# Patient Record
Sex: FEMALE | Employment: FULL TIME | URBAN - METROPOLITAN AREA
[De-identification: names, ages, dates, MRNs, and addresses within clinical notes are randomized per-mention and may not be internally consistent; named-entity substitution may affect disease eponyms.]

---

## 2019-02-13 ENCOUNTER — HOSPITAL ENCOUNTER (OUTPATIENT)
Dept: PHYSICAL THERAPY | Age: 49
Discharge: HOME | End: 2019-02-13
Payer: COMMERCIAL

## 2019-02-13 PROCEDURE — 97140 MANUAL THERAPY 1/> REGIONS: CPT

## 2019-02-13 PROCEDURE — 97110 THERAPEUTIC EXERCISES: CPT

## 2019-02-13 PROCEDURE — 97162 PT EVAL MOD COMPLEX 30 MIN: CPT

## 2019-02-13 NOTE — PROGRESS NOTES
In [address] [facility] [address] [place], [address] 
[phone] fax Plan of Care/ Statement of Necessity for Physical Therapy Services Patient name: Jose Alberto Johnson Start of Care: 2019 Referral source: Madi Helm MD : 1970 Medical Diagnosis: Cervicalgia [M54.2] Payor: [employer] / Plan: [place] [employer] / Product Type: PPO /  Onset Date:1 month ago Treatment Diagnosis: neck pain Prior Hospitalization: see medical history Provider#: [id number] Medications: Verified on Patient summary List  
 Comorbidities: allergies, OA, back pain, DM Prior Level of Function: Employed as a  and works out recreationally The Plan of Care and following information is based on the information from the initial evaluation. Assessment/ key information: Pt presents with complaints of acute left sided neck pain with no known CHRISTINA. Pain is mostly present in the left UT and occasionally causes HA pain. Continue PT to address posture, cervical pain, core strength, and pain. Evaluation Complexity History LOW Complexity : Zero comorbidities / personal factors that will impact the outcome / POC; Examination MEDIUM Complexity : 3 Standardized tests and measures addressing body structure, function, activity limitation and / or participation in recreation  ;Presentation MEDIUM Complexity : Evolving with changing characteristics  ; Clinical Decision Making MEDIUM Complexity : FOTO score of 26-74 Overall Complexity Rating: MEDIUM Problem List: pain affecting function, decrease ROM, decrease strength, impaired gait/ balance, decrease ADL/ functional abilitiies, decrease activity tolerance and decrease flexibility/ joint mobility Treatment Plan may include any combination of the following: Therapeutic exercise, Therapeutic activities, Neuromuscular re-education, Physical agent/modality, Gait/balance training, Manual therapy and Patient education Patient / Family readiness to learn indicated by: asking questions, trying to perform skills and interest 
Persons(s) to be included in education: patient (P) Barriers to Learning/Limitations: None Patient Goal (s): To eliminate the pain Patient Self Reported Health Status: good Rehabilitation Potential: good Short Term Goals: To be accomplished in 2 weeks: 1. Pt will be compliant with HEP 2x/day to increase ease of ADLs 2. Pt will improve left cervical rotation to 60 degrees without pain to increase ease of ADLs Long Term Goals: To be accomplished in 4 weeks: 1. Pt will improve FOTO to 63 to increase ease of ADLs 2. 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 Pt will be able to look up to see a bird without difficulty to increase ease of ADLs Frequency / Duration: Patient to be seen 2 times per week for 4 weeks. Patient/ Caregiver education and instruction: Diagnosis, prognosis, self care, activity modification and exercises 
 [x]  Plan of care has been reviewed with Tasha Isidro 2/13/2019 12:15 PM 
 
________________________________________________________________________ I certify that the above Therapy Services are being furnished while the patient is under my care. I agree with the treatment plan and certify that this therapy is necessary. [de-identified] Signature:____________Date:_________TIME:________ 
 
[employer], Date and Time must be completed for valid certification ** Please sign and return to In [address] [place], [address] 
[phone] fax

## 2019-02-13 NOTE — PROGRESS NOTES
PT DAILY TREATMENT NOTE 10-18 Patient Name: Nestor Mariscal Date:2019 : 1970 [x]  Patient  Verified Payor: BLUE CROSS / Plan: [place] [employer] / Product Type: PPO / In time:1105  Out time:1149 Total Treatment Time (min): 44 Visit #: 1 of 8 Medicare/BCBS Only Total Timed Codes (min):  23 1:1 Treatment Time:  44 Treatment Area: Cervicalgia [M54.2] SUBJECTIVE Pain Level (0-10 scale): 10 Any medication changes, allergies to medications, adverse drug reactions, diagnosis change, or new procedure performed?: [x] No    [] Yes (see summary sheet for update) Subjective functional status/changes:   [] No changes reported Severe left sided neck pain without CHRISTINA OBJECTIVE 21 min [x]Eval                  []Re-Eval  
 
8 min Manual Therapy:  SOR, MET for C4 right rotation Rationale: decrease pain, increase ROM, increase tissue extensibility, decrease trigger points and increase postural awareness to improve cervical mobility and pain 15 min Therapeutic Exercise:  [x] See flow sheet :  
Rationale: increase ROM and increase strength to improve the patients ability to increase ease of ADLs With 
 [] TE 
 [] TA 
 [] neuro 
 [] other: Patient Education: [x] Review HEP [] Progressed/Changed HEP based on:  
[] positioning   [] body mechanics   [] transfers   [] heat/ice application   
[] other:   
 
Other Objective/Functional Measures: refer to eval  
 
Pain Level (0-10 scale) post treatment: 5 
 
ASSESSMENT/Changes in Function: 50% less pain after HEP instruction and light manual. Note that pt does not tolerate manual therapy very well but therapist is hopeful that HEP with desensitize her for better tolerance.  
 
Patient will continue to benefit from skilled PT services to modify and progress therapeutic interventions, address functional mobility deficits, address ROM deficits, address strength deficits, analyze and address soft tissue restrictions, analyze and cue movement patterns, analyze and modify body mechanics/ergonomics and assess and modify postural abnormalities to attain remaining goals. []  See Plan of Care 
[]  See progress note/recertification 
[]  See Discharge Summary Progress towards goals Short Term Goals: To be accomplished in 2 weeks: 1. Pt will be compliant with HEP 2x/day to increase ease of ADLs 2. Pt will improve left cervical rotation to 60 degrees without pain to increase ease of ADLs 
  
Long Term Goals: To be accomplished in 4 weeks: 1. Pt will improve FOTO to 63 to increase ease of ADLs 2. 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 Pt will be able to look up to see a bird without difficulty to increase ease of ADLs 
  
 
 
PLAN 
[]  Upgrade activities as tolerated     [x]  Continue plan of care 
[]  Update interventions per flow sheet      
[]  Discharge due to:_ 
[]  Other:_ Lurline Homans, [place] 2/13/2019  12:31 PM 
 
Future Appointments Date Time Provider Mike Montenegro 2/20/2019  6:00 PM Tico Fletcher, PT [facility]  
2/22/2019  4:30 PM Roslyn Herrera, PT [facility]  
2/26/2019  5:30 PM Sary Phlegm, PTA [facility]  
2/27/2019 12:00 PM Yuriy Payan, PT [facility]  
3/5/2019  5:30 PM Sary Phlegm, PTA [facility]  
3/8/2019  6:00 PM Roslyn Herrera, PT [facility]  
3/18/2019  6:00 PM Yuriy Payan, PT [facility]

## 2019-02-20 ENCOUNTER — HOSPITAL ENCOUNTER (OUTPATIENT)
Dept: PHYSICAL THERAPY | Age: 49
Discharge: HOME | End: 2019-02-20
Payer: COMMERCIAL

## 2019-02-20 PROCEDURE — 97110 THERAPEUTIC EXERCISES: CPT

## 2019-02-20 PROCEDURE — 97012 MECHANICAL TRACTION THERAPY: CPT

## 2019-02-20 PROCEDURE — 97140 MANUAL THERAPY 1/> REGIONS: CPT

## 2019-02-20 NOTE — PROGRESS NOTES
PT DAILY TREATMENT NOTE 10-18 Patient Name: Arian Almaguer Date:2019 : 1970 [x]  Patient  Verified Payor: BLUE CROSS / Plan: [place] [employer]ERAL / Product Type: PPO / In time:6:00  Out time:6:47 Total Treatment Time (min): 47 Visit #: 2 of 8 Medicare/BCBS Only Total Timed Codes (min):  37 1:1 Treatment Time:  37 Treatment Area: Cervicalgia [M54.2] SUBJECTIVE Pain Level (0-10 scale): 6 Any medication changes, allergies to medications, adverse drug reactions, diagnosis change, or new procedure performed?: [x] No    [] Yes (see summary sheet for update) Subjective functional status/changes:   [] No changes reported Pt reports she is doing okay today but her pain is up and down. OBJECTIVE Modality rationale: decrease pain and increase tissue extensibility to improve the patients ability to perform daily tasks Type Additional Details  
[] Estim:  []Unatt       []IFC  []Premod []Other:  []w/ice   []w/heat Position: Location:  
[] Estim: []Att    []TENS instruct  []NMES []Other:  []w/US   []w/ice   []w/heat Position: Location:  
[x]  Traction: [x] Cervical       []Lumbar 
                     [] Prone          [x]Supine []Intermittent   [x]Continuous Lbs:22 [x] before manual 
[] after manual  
[]  Ultrasound: []Continuous   [] Pulsed []1MHz   []3MHz W/cm2: 
Location:  
[]  Iontophoresis with dexamethasone Location: [] Take home patch  
[] In clinic  
[]  Ice     []  heat 
[]  Ice massage 
[]  Laser  
[]  Anodyne Position: Location:  
[]  Laser with stim 
[]  Other:  Position: Location:  
[]  Vasopneumatic Device Pressure:       [] lo [] med [] hi  
Temperature: [] lo [] med [] hi  
[] Skin assessment post-treatment:  []intact []redness- no adverse reaction 
  []redness  adverse reaction:  
 
 
17 min Therapeutic Exercise:  [x] See flow sheet :  
 Rationale: increase ROM and increase strength to improve the patients ability to perform daily tasks 12 min Neuromuscular Re-education:  [x]  See flow sheet :  
Rationale: increase strength and increase proprioception   to improve the patients ability to perform daily tasks 8 min Manual Therapy:  GT5 to B UT and levator + TPR Rationale: decrease pain, increase ROM and increase tissue extensibility to improve ability for perform daily tasks With 
 [] TE 
 [] TA 
 [] neuro 
 [] other: Patient Education: [x] Review HEP [] Progressed/Changed HEP based on:  
[] positioning   [] body mechanics   [] transfers   [] heat/ice application   
[] other:   
 
Other Objective/Functional Measures: initiated treatment per flow sheet. Pain Level (0-10 scale) post treatment: 7 ASSESSMENT/Changes in Function:  Pt apprehensive initially with treatment but seemed to improve throughout treatment. + c/o pain with sidelying ER. C/o some pain increase following treatment. Fair overall tolerance to treatment. Patient will continue to benefit from skilled PT services to modify and progress therapeutic interventions, address functional mobility deficits, address ROM deficits, address strength deficits, analyze and address soft tissue restrictions, analyze and cue movement patterns, analyze and modify body mechanics/ergonomics and assess and modify postural abnormalities to attain remaining goals. []  See Plan of Care 
[]  See progress note/recertification 
[]  See Discharge Summary Progress towards goals / Updated goals: 
  
Short Term Goals: To be accomplished in 2 weeks: 1. Pt will be compliant with HEP 2x/day to increase ease of ADLs 2. Pt will improve left cervical rotation to 60 degrees without pain to increase ease of ADLs 
  
Long Term Goals: To be accomplished in 4 weeks: 1. Pt will improve FOTO to 63 to increase ease of ADLs 2.  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 Pt will be able to look up to see a bird without difficulty to increase ease of ADLs 
  
 
PLAN 
[]  Upgrade activities as tolerated     [x]  Continue plan of care 
[]  Update interventions per flow sheet      
[]  Discharge due to:_ 
[]  Other:_ Tammy Chavarria, PT 2/20/2019  6:10 PM 
 
Future Appointments Date Time Provider Mike Montenegro 2/22/2019  4:30 PM Jesus Mcclure, PT [facility]  
2/26/2019  5:30 PM Shabana Ibarra, ANA [facility]  
2/27/2019 12:00 PM Kitty Vences, PT [facility]  
3/5/2019  5:30 PM Shabana Ibarra, ANA [facility]  
3/8/2019  6:00 PM Jesus Mcclure, PT [facility]  
3/18/2019  6:00 PM Kitty Vences, PT [facility]

## 2019-02-22 ENCOUNTER — HOSPITAL ENCOUNTER (OUTPATIENT)
Dept: PHYSICAL THERAPY | Age: 49
Discharge: HOME | End: 2019-02-22
Payer: COMMERCIAL

## 2019-02-22 PROCEDURE — 97110 THERAPEUTIC EXERCISES: CPT

## 2019-02-22 PROCEDURE — 97140 MANUAL THERAPY 1/> REGIONS: CPT

## 2019-02-22 PROCEDURE — 97012 MECHANICAL TRACTION THERAPY: CPT

## 2019-02-22 NOTE — PROGRESS NOTES
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 OBJECTIVE Modality rationale: increase tissue extensibility to improve the patients ability to perform ADLs with improved ease. Min Type Additional Details  
 [] Estim:  []Unatt       []IFC  []Premod []Other:  []w/ice   []w/heat Position: Location:  
 [] Estim: []Att    []TENS instruct  []NMES []Other:  []w/US   []w/ice   []w/heat Position: Location:  
10 [x]  Traction: [x] Cervical       []Lumbar 
                     [] Prone          [x]Supine 
                     [x]Intermittent   []Continuous Lbs: 20 
[x] before manual 
[] after manual  
 []  Ultrasound: []Continuous   [] Pulsed []1MHz   []3MHz W/cm2: 
Location:  
 []  Iontophoresis with dexamethasone Location: [] Take home patch  
[] In clinic  
 []  Ice     []  heat 
[]  Ice massage 
[]  Laser  
[]  Anodyne Position: Location:  
 []  Laser with stim 
[]  Other:  Position: Location:  
 []  Vasopneumatic Device Pressure:       [] lo [] med [] hi  
Temperature: [] lo [] med [] hi  
[] Skin assessment post-treatment:  []intact []redness- no adverse reaction 
  []redness  adverse reaction:  
 
 
22 min Therapeutic Exercise:  [x] See flow sheet :  
 Rationale: increase ROM and increase strength to improve the patients ability to perform ADLs with improved ease. 8 min Manual Therapy:  STM (B) UT, TPR Rationale: decrease pain, increase ROM and increase tissue extensibility to improve patients functional mobility. With 
 [] TE 
 [] TA 
 [] neuro 
 [] other: Patient Education: [x] Review HEP [] Progressed/Changed HEP based on:  
[] positioning   [] body mechanics   [] transfers   [] heat/ice application   
[] other:   
 
Other Objective/Functional Measures: Increased myofascial restrictions (B) UT. Pain Level (0-10 scale) post treatment: 5 
 
ASSESSMENT/Changes in Function: No change in pain post session, patient tolerated all exercises. Patient requires frequent cueing throughout session in order to maintain proper form. Increased myofascial restrictions (B) UT. Patient will continue to benefit from skilled PT services to modify and progress therapeutic interventions, address functional mobility deficits, address ROM deficits, address strength deficits, analyze and address soft tissue restrictions, analyze and cue movement patterns, analyze and modify body mechanics/ergonomics and assess and modify postural abnormalities to attain remaining goals. [x]  See Plan of Care 
[]  See progress note/recertification 
[]  See Discharge Summary Progress towards goals / Updated goals: 
Short Term Goals: To be accomplished in 2 weeks: 1.  Pt will be compliant with HEP 2x/day to increase ease of ADLs 2/22/19 - patient reports semi compliance. 2.  Pt will improve left cervical rotation to 60 degrees without pain to increase ease of ADLs 
  
Long Term Goals: To be accomplished in 4 weeks: 1.  Pt will improve FOTO to 63 to increase ease of ADLs 2.  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  Pt will be able to look up to see a bird without difficulty to increase ease of ADLs PLAN 
[]  Upgrade activities as tolerated     [x]  Continue plan of care 
[]  Update interventions per flow sheet      
[]  Discharge due to:_ 
[]  Other:_ Sol Grande, PT 2/22/2019  4:37 PM 
 
Future Appointments Date Time Provider Mike Montenegro 2/26/2019  5:30 PM Kalina Rivera PTA [facility]  
2/27/2019 12:00 PM Morelia Coulter, PT [facility]  
3/5/2019  5:30 PM Kalina Rivera PTA [facility]  
3/8/2019  6:00 PM Radha Jewell, PT [facility]  
3/18/2019  6:00 PM Morelia Coulter, PT [facility]

## 2019-02-26 ENCOUNTER — HOSPITAL ENCOUNTER (OUTPATIENT)
Dept: PHYSICAL THERAPY | Age: 49
Discharge: HOME | End: 2019-02-26
Payer: COMMERCIAL

## 2019-02-26 PROCEDURE — 97012 MECHANICAL TRACTION THERAPY: CPT

## 2019-02-26 PROCEDURE — 97140 MANUAL THERAPY 1/> REGIONS: CPT

## 2019-02-26 PROCEDURE — 97110 THERAPEUTIC EXERCISES: CPT

## 2019-02-26 NOTE — PROGRESS NOTES
PT DAILY TREATMENT NOTE 10-18 Patient Name: Perla Valenzuela Date:2019 : 1970 [x]  Patient  Verified Payor: BLUE CROSS / Plan: [place] BLUE CROSS FEDERAL / Product Type: PPO / In time:5:30  Out time:6:18 Total Treatment Time (min): 48 Visit #: 4 of 8 Medicare/BCBS Only Total Timed Codes (min):  38 1:1 Treatment Time:  45 Treatment Area: Cervicalgia [M54.2] SUBJECTIVE Pain Level (0-10 scale): 410 Any medication changes, allergies to medications, adverse drug reactions, diagnosis change, or new procedure performed?: [x] No    [] Yes (see summary sheet for update) Subjective functional status/changes:   [] No changes reported \"It's feeling better. \" OBJECTIVE Modality rationale: decrease pain and increase tissue extensibility to improve the patients ability to perform ADL's. Min Type Additional Details  
 [] Estim:  []Unatt       []IFC  []Premod []Other:  []w/ice   []w/heat Position: Location:  
 [] Estim: []Att    []TENS instruct  []NMES []Other:  []w/US   []w/ice   []w/heat Position: Location:  
10 [x]  Traction: [x] Cervical       []Lumbar 
                     [] Prone          [x]Supine 
                     [x]Intermittent   []Continuous Lbs: 20/10# 
[] before manual 
[] after manual  
 []  Ultrasound: []Continuous   [] Pulsed []1MHz   []3MHz W/cm2: 
Location:  
 []  Iontophoresis with dexamethasone Location: [] Take home patch  
[] In clinic  
 []  Ice     []  heat 
[]  Ice massage 
[]  Laser  
[]  Anodyne Position: Location:  
 []  Laser with stim 
[]  Other:  Position: Location:  
 []  Vasopneumatic Device Pressure:       [] lo [] med [] hi  
Temperature: [] lo [] med [] hi  
[] Skin assessment post-treatment:  [x]intact []redness- no adverse reaction 
  []redness  adverse reaction:  
 
30 min Therapeutic Exercise:  [x] See flow sheet :  
 Rationale: increase ROM, increase strength and increase proprioception to improve the patients ability to perform ADL's. 
  
8 min Manual Therapy:  SOR/. STM/DTM (B) UT, lev scap, C/S paraspinals. C/S PROM. Rationale: decrease pain, increase ROM, increase tissue extensibility and decrease trigger points to improve ease of performing functional activities. With 
 [x] TE 
 [] TA 
 [] neuro 
 [] other: Patient Education: [x] Review HEP [] Progressed/Changed HEP based on:  
[] positioning   [] body mechanics   [] transfers   [] heat/ice application   
[] other:   
 
Other Objective/Functional Measures: AROM C/S rotation Left 60 degrees, Right 60 degrees. Pain Level (0-10 scale) post treatment: 4/10 ASSESSMENT/Changes in Function: Improved C/S mobility since [facility]. Pt expressed a decrease in pain and tension post-treatment however stated the same pain rating. Patient will continue to benefit from skilled PT services to modify and progress therapeutic interventions, address functional mobility deficits, address ROM deficits, address strength deficits, analyze and address soft tissue restrictions and analyze and modify body mechanics/ergonomics to attain remaining goals. [x]  See Plan of Care 
[]  See progress note/recertification 
[]  See Discharge Summary Progress towards goals / Updated goals: 
Short Term Goals: To be accomplished in 2 weeks: 1.  Pt will be compliant with HEP 2x/day to increase ease of ADLs 2/22/19 - patient reports semi compliance. 2.  Pt will improve left cervical rotation to 60 degrees without pain to increase ease of ADLs - AROM C/S rotation Left 60 degrees, Right 60 degrees. 2/26/2019 
  
Long Term Goals: To be accomplished in 4 weeks: 1.  Pt will improve FOTO to 63 to increase ease of ADLs 2.  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  Pt will be able to look up to see a bird without difficulty to increase ease of ADLs PLAN 
[]  Upgrade activities as tolerated     [x]  Continue plan of care 
[]  Update interventions per flow sheet      
[]  Discharge due to:_ 
[]  Other:_   
 
Maryana Mendez PTA 2/26/2019  5:34 PM 
 
Future Appointments Date Time Provider Mike Montenegro 2/27/2019 12:00 PM Julio Badillo PT [facility]  
3/5/2019  5:30 PM Vadim Allen PTA [facility]  
3/18/2019  6:00 PM Julio Badillo PT [facility]

## 2019-02-27 ENCOUNTER — HOSPITAL ENCOUNTER (OUTPATIENT)
Dept: PHYSICAL THERAPY | Age: 49
Discharge: HOME | End: 2019-02-27
Payer: COMMERCIAL

## 2019-02-27 PROCEDURE — 97140 MANUAL THERAPY 1/> REGIONS: CPT

## 2019-02-27 PROCEDURE — 97012 MECHANICAL TRACTION THERAPY: CPT

## 2019-02-27 PROCEDURE — 97110 THERAPEUTIC EXERCISES: CPT

## 2019-02-27 NOTE — PROGRESS NOTES
PT DAILY TREATMENT NOTE 10-18 Patient Name: Rosemary Mills Date:2019 : 1970 [x]  Patient  Verified Payor: BLUE CROSS / Plan: [place] BLUE [place]OSS FEDERAL / Product Type: PPO / In time:1200  Out time:1242 Total Treatment Time (min): 42 Visit #: 5 of 8 Medicare/BCBS Only Total Timed Codes (min):  32 1:1 Treatment Time:  42 Treatment Area: Cervicalgia [M54.2] SUBJECTIVE Pain Level (0-10 scale): 4 Any medication changes, allergies to medications, adverse drug reactions, diagnosis change, or new procedure performed?: [x] No    [] Yes (see summary sheet for update) Subjective functional status/changes:   [] No changes reported I am feeling better. OBJECTIVE 24 min Therapeutic Exercise:  [x] See flow sheet :  
Rationale: increase ROM and increase strength to improve the patients ability to increase ease of ADLs 8 min Manual Therapy:  Per flow sheet Rationale: decrease pain, increase ROM and increase tissue extensibility to improve cervical ROM Modality rationale: decrease edema, decrease inflammation and decrease pain to improve the patients ability to increase ease of ADls Min Type Additional Details  
 [] Estim:  []Unatt       []IFC  []Premod []Other:  []w/ice   []w/heat Position: Location:  
 [] Estim: []Att    []TENS instruct  []NMES []Other:  []w/US   []w/ice   []w/heat Position: Location:  
10 [x]  Traction: [x] Cervical       []Lumbar 
                     [] Prone          [x]Supine []Intermittent   [x]Continuous Lbs:20 
[] before manual 
[x] after manual  
 []  Ultrasound: []Continuous   [] Pulsed []1MHz   []3MHz Location: 
W/cm2:  
 []  Iontophoresis with dexamethasone Location: [] Take home patch  
[] In clinic  
 []  Ice     []  heat 
[]  Ice massage 
[]  Laser  
[]  Anodyne Position: Location:  
 []  Laser with stim 
[]  Other: Position: Location: []  Vasopneumatic Device Pressure:       [] lo [] med [] hi  
Temperature: [] lo [] med [] hi  
[x] Skin assessment post-treatment:  [x]intact []redness- no adverse reaction 
  []redness  adverse reaction:  
       
With 
 [] TE 
 [] TA 
 [] neuro 
 [] other: Patient Education: [x] Review HEP [] Progressed/Changed HEP based on:  
[] positioning   [] body mechanics   [] transfers   [] heat/ice application   
[] other:   
 
Other Objective/Functional Measures: Still guards heavily with SOR and manual techniques. After 5 minutes of relaxation trials, pt was able to let go and allow release of Suboccipital muscles. Very poor technique with QP work constantly lowering COG for stability and falling into APT Pain Level (0-10 scale) post treatment: 4 
 
ASSESSMENT/Changes in Function: Great progress with pain management. Limited progress with strength and postural awareness Patient will continue to benefit from skilled PT services to modify and progress therapeutic interventions, address functional mobility deficits, address ROM deficits, address strength deficits, analyze and address soft tissue restrictions, analyze and cue movement patterns, analyze and modify body mechanics/ergonomics and assess and modify postural abnormalities to attain remaining goals. []  See Plan of Care 
[]  See progress note/recertification 
[]  See Discharge Summary Progress towards goals / Updated goals: 
Short Term Goals: To be accomplished in 2 weeks: 1.  Pt will be compliant with HEP 2x/day to increase ease of ADLs 2/22/19 - patient reports semi compliance.  
2.  Pt will improve left cervical rotation to 60 degrees without pain to increase ease of ADLs - AROM C/S rotation Left 60 degrees, Right 60 degrees. 2/26/2019 
  
Long Term Goals: To be accomplished in 4 weeks: 1.  Pt will improve FOTO to 63 to increase ease of ADLs 2.  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  Pt will be able to look up to see a bird without difficulty to increase ease of ADLs 
  
 
PLAN 
[]  Upgrade activities as tolerated     [x]  Continue plan of care 
[]  Update interventions per flow sheet      
[]  Discharge due to:_ 
[]  Other:_ Tasha Chavez 2/27/2019  12:06 PM 
 
Future Appointments Date Time Provider Mike Montenegro 3/5/2019  5:30 PM Berkley Martinez PTA [facility]  
3/18/2019  6:00 PM [facility]

## 2019-03-05 ENCOUNTER — HOSPITAL ENCOUNTER (OUTPATIENT)
Dept: PHYSICAL THERAPY | Age: 49
Discharge: HOME | End: 2019-03-05
Payer: COMMERCIAL

## 2019-03-05 PROCEDURE — 97140 MANUAL THERAPY 1/> REGIONS: CPT

## 2019-03-05 PROCEDURE — 97110 THERAPEUTIC EXERCISES: CPT

## 2019-03-05 PROCEDURE — 97012 MECHANICAL TRACTION THERAPY: CPT

## 2019-03-05 NOTE — PROGRESS NOTES
PT DAILY TREATMENT NOTE 10-18    Patient Name: Sarah Baltazar  Date:3/5/2019  : 1970  [x]  Patient  Verified    Payor: Mariangel Castaneda / Plan:  [facility] / Product Type: PPO /    In time:5:30  Out time:6:12  Total Treatment Time (min): 42  Visit #: 6 of 8    Medicare/BCBS Only   Total Timed Codes (min):  32 1:1 Treatment Time:  32       Treatment Area: Cervicalgia [M54.2]    SUBJECTIVE  Pain Level (0-10 scale): 6/10  Any medication changes, allergies to medications, adverse drug reactions, diagnosis change, or new procedure performed?: [x] No    [] Yes (see summary sheet for update)  Subjective functional status/changes:   [] No changes reported  \"Pain is a little higher today, started while I was driving over, hurts on the right side. \"    OBJECTIVE    Modality rationale: decrease pain and increase tissue extensibility to improve the patients ability to perform ADL's.    Min Type Additional Details    [] Estim:  []Unatt       []IFC  []Premod                        []Other:  []w/ice   []w/heat  Position:  Location:    [] Estim: []Att    []TENS instruct  []NMES                    []Other:  []w/US   []w/ice   []w/heat  Position:  Location:   10 [x]  Traction: [x] Cervical       []Lumbar                       [] Prone          [x]Supine                       []Intermittent   [x]Continuous Lbs: 20#  [x] before manual  [] after manual    []  Ultrasound: []Continuous   [] Pulsed                           []1MHz   []3MHz W/cm2:  Location:    []  Iontophoresis with dexamethasone         Location: [] Take home patch   [] In clinic    []  Ice     []  heat  []  Ice massage  []  Laser   []  Anodyne Position:  Location:    []  Laser with stim  []  Other:  Position:  Location:    []  Vasopneumatic Device Pressure:       [] lo [] med [] hi   Temperature: [] lo [] med [] hi   [] Skin assessment post-treatment:  []intact []redness- no adverse reaction    []redness  adverse reaction:     24 min Therapeutic Exercise:  [x] See flow sheet :   Rationale: increase ROM, increase strength and increase proprioception to improve the patients ability to perform ADL's.     8 min Manual Therapy:  SOR. DTM/TPR (B) UT, lev scap, C/S paraspinals. Manual UT stretch (B)   Rationale: decrease pain, increase ROM, increase tissue extensibility and decrease trigger points to improve ease of functional activities. With   [x] TE   [] TA   [] neuro   [] other: Patient Education: [x] Review HEP    [] Progressed/Changed HEP based on:   [] positioning   [] body mechanics   [] transfers   [] heat/ice application    [] other:      Other Objective/Functional Measures: Pt reports 80% improvement regarding ability to turn head while driving. Increased side-lying PRE's to 2#. Pain Level (0-10 scale) post treatment: 4/10    ASSESSMENT/Changes in Function: Demonstrates improved C/S mobility. Pt reports improved ease with ADL's and work related activities. Difficulty maintaining neutral spine with QP series. Patient will continue to benefit from skilled PT services to modify and progress therapeutic interventions, address functional mobility deficits, address ROM deficits, address strength deficits, analyze and address soft tissue restrictions and analyze and modify body mechanics/ergonomics to attain remaining goals.      [x]  See Plan of Care  []  See progress note/recertification  []  See Discharge Summary         Progress towards goals / Updated goals:  Short Term Goals: To be accomplished in 2 weeks:  1.  Pt will be compliant with HEP 2x/day to increase ease of ADLs 2/22/19 - patient reports semi compliance.   2.  Pt will improve left cervical rotation to 60 degrees without pain to increase ease of ADLs - AROM C/S rotation Left 60 degrees, Right 60 degrees.  2/26/2019     Long Term Goals: To be accomplished in 4 weeks:  1.  Pt will improve FOTO to 63 to increase ease of ADLs  2.  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  3/5/2019  3.  Pt will be able to look up to see a bird without difficulty to increase ease of ADLs    PLAN  []  Upgrade activities as tolerated     [x]  Continue plan of care  []  Update interventions per flow sheet       []  Discharge due to:_  []  Other:_      Marisela Falcon PTA 3/5/2019  5:28 PM    Future Appointments   Date Time Provider Mike Montenegro   3/5/2019  5:30 PM Odilia Plasencia PTA [facility]   3/13/2019  3:30 PM [facility]   3/18/2019  6:00 PM [facility]

## 2019-03-08 ENCOUNTER — APPOINTMENT (OUTPATIENT)
Dept: PHYSICAL THERAPY | Age: 49
End: 2019-03-08
Payer: COMMERCIAL

## 2019-03-13 ENCOUNTER — HOSPITAL ENCOUNTER (OUTPATIENT)
Dept: PHYSICAL THERAPY | Age: 49
Discharge: HOME | End: 2019-03-13
Payer: COMMERCIAL

## 2019-03-13 PROCEDURE — 97012 MECHANICAL TRACTION THERAPY: CPT

## 2019-03-13 PROCEDURE — 97140 MANUAL THERAPY 1/> REGIONS: CPT

## 2019-03-13 PROCEDURE — 97110 THERAPEUTIC EXERCISES: CPT

## 2019-03-13 NOTE — PROGRESS NOTES
PT DAILY TREATMENT NOTE 10-18    Patient Name: Rosemary Mills  Date:3/13/2019  : 1970  [x]  Patient  Verified   Payor: BLUE CROSS / Plan: [employer] [facility] [place] / Product Type: PPO /    In time:3:30  Out time:4:20  Total Treatment Time (min): 50  Visit #: 7 of 8    Medicare/BCBS Only   Total Timed Codes (min):  40 1:1 Treatment Time:  39       Treatment Area: Cervicalgia [M54.2]    SUBJECTIVE  Pain Level (0-10 scale): 2/10  Any medication changes, allergies to medications, adverse drug reactions, diagnosis change, or new procedure performed?: [x] No    [] Yes (see summary sheet for update)  Subjective functional status/changes:   [] No changes reported  \"Doing pretty good. I think stress is part of my issue. \"    OBJECTIVE    Modality rationale: decrease pain to improve the patients ability to perform ADL's.    Min Type Additional Details    [] Estim:  []Unatt       []IFC  []Premod                        []Other:  []w/ice   []w/heat  Position:  Location:    [] Estim: []Att    []TENS instruct  []NMES                    []Other:  []w/US   []w/ice   []w/heat  Position:  Location:   10 [x]  Traction: [x] Cervical       []Lumbar                       [] Prone          []Supine                       []Intermittent   [x]Continuous Lbs: 20#  [x] before manual  [] after manual    []  Ultrasound: []Continuous   [] Pulsed                           []1MHz   []3MHz W/cm2:  Location:    []  Iontophoresis with dexamethasone         Location: [] Take home patch   [] In clinic    []  Ice     []  heat  []  Ice massage  []  Laser   []  Anodyne Position:  Location:    []  Laser with stim  []  Other:  Position:  Location:    []  Vasopneumatic Device Pressure:       [] lo [] med [] hi   Temperature: [] lo [] med [] hi   [] Skin assessment post-treatment:  []intact []redness- no adverse reaction    []redness - adverse reaction:     32 min Therapeutic Exercise:  [x] See flow sheet :   Rationale: increase ROM, increase strength and increase proprioception to improve the patients ability to perform ADL's.    8 min Manual Therapy:  SOR. DTM/TPR (B) UT, lev scap. Rationale: decrease pain, increase ROM, increase tissue extensibility and decrease trigger points to improve ease of performing functional activities. With   [x] TE   [] TA   [] neuro   [] other: Patient Education: [x] Review HEP    [] Progressed/Changed HEP based on:   [] positioning   [] body mechanics   [] transfers   [] heat/ice application    [] other:      Other Objective/Functional Measures: FOTO 81%. Reports improved management of symptoms, continues to perform HEP and correcting posture while working. Pt reports no difficulty looking up to see a bird/plane. Pain Level (0-10 scale) post treatment: 0/10    ASSESSMENT/Changes in Function:      []  See Plan of Care  []  See progress note/recertification  [x]  See Discharge Summary         Progress towards goals / Updated goals:  Short Term Goals: To be accomplished in 2 weeks:  1.  Pt will be compliant with HEP 2x/day to increase ease of ADLs 2/22/19 - patient reports semi compliance. 2.  Pt will improve left cervical rotation to 60 degrees without pain to increase ease of ADLs - AROM C/S rotation Left 60 degrees, Right 60 degrees.  2/26/2019     Long Term Goals: To be accomplished in 4 weeks:  1.  Pt will improve FOTO to 63 to increase ease of ADLs - FOTO 81%. 3/13/2019  2.  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 3/5/2019  3.  Pt will be able to look up to see a bird without difficulty to increase ease of ADLs - No difficulty per pt. 3/13/2019    PLAN  []  Upgrade activities as tolerated     []  Continue plan of care  []  Update interventions per flow sheet       [x]  Discharge due to: Met LTG's, pt given discharge instructions.   []  Other:_      Marisela Falcon PTA 3/13/2019  3:31 PM    Future Appointments   Date Time Provider Mike Montenegro   3/15/2019  7:30 AM Adrian Cline, PTA [facility]   3/18/2019  6:00 PM Britton Cordova, PT [facility]

## 2019-03-15 ENCOUNTER — APPOINTMENT (OUTPATIENT)
Dept: PHYSICAL THERAPY | Age: 49
End: 2019-03-15
Payer: COMMERCIAL

## 2019-03-15 NOTE — PROGRESS NOTES
[facility]  [address]  [place], [address]  [phone] fax    Physical Therapy Discharge Summary  Patient name: Perla Valenzuela Start of Care: 2019   Referral source: Irvin Wang MD : 1970                Medical Diagnosis: Cervicalgia [M54.2]  Payor: BLUE CROSS / Plan: [email or web address] [facility] / Product Type: PPO /  Onset Date:1 month ago                Treatment Diagnosis: neck pain   Prior Hospitalization: see medical history Provider#: [id number]   Medications: Verified on Patient summary List    Comorbidities: allergies, OA, back pain, DM   Prior Level of Function: Employed as a  and works out recreationally           Visits from [facility]: 7    Missed Visits: 0  Reporting Period : 2019 to 3/13/2019      Summary of Care:  Short Term Goals: To be accomplished in 2 weeks:  1.  Pt will be compliant with HEP 2x/day to increase ease of ADLs 19 - patient reports semi compliance. 2.  Pt will improve left cervical rotation to 60 degrees without pain to increase ease of ADLs - AROM C/S rotation Left 60 degrees, Right 60 degrees.  2019     Long Term Goals: To be accomplished in 4 weeks:  1.  Pt will improve FOTO to 63 to increase ease of ADLs - FOTO 81%. 3/13/2019  2.  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 3/5/2019  3.  Pt will be able to look up to see a bird without difficulty to increase ease of ADLs - No difficulty per pt. 3/13/2019        ASSESSMENT/RECOMMENDATIONS: The patient has met all her LTG with exception of turning her head with out difficulty (she reports 80% ease). She has been discharged with D/C instructions and been instructed to continue HEP.      [x]Discontinue therapy: [x]Patient has reached or is progressing toward set goals      []Patient is non-compliant or has abdicated      []Due to lack of appreciable progress towards set [address], PT 3/15/2019 8:41 AM

## 2019-03-18 ENCOUNTER — APPOINTMENT (OUTPATIENT)
Dept: PHYSICAL THERAPY | Age: 49
End: 2019-03-18
Payer: COMMERCIAL